# Patient Record
Sex: MALE | Race: WHITE | URBAN - METROPOLITAN AREA
[De-identification: names, ages, dates, MRNs, and addresses within clinical notes are randomized per-mention and may not be internally consistent; named-entity substitution may affect disease eponyms.]

---

## 2024-08-21 ENCOUNTER — OFFICE VISIT (OUTPATIENT)
Age: 29
End: 2024-08-21

## 2024-08-21 VITALS
WEIGHT: 213.8 LBS | SYSTOLIC BLOOD PRESSURE: 121 MMHG | HEIGHT: 70 IN | BODY MASS INDEX: 30.61 KG/M2 | DIASTOLIC BLOOD PRESSURE: 76 MMHG | HEART RATE: 74 BPM | TEMPERATURE: 98.5 F | OXYGEN SATURATION: 95 %

## 2024-08-21 DIAGNOSIS — J06.9 VIRAL URI WITH COUGH: Primary | ICD-10-CM

## 2024-08-21 DIAGNOSIS — J02.9 SORE THROAT: ICD-10-CM

## 2024-08-21 LAB
INFLUENZA A ANTIGEN, POC: NEGATIVE
INFLUENZA B ANTIGEN, POC: NEGATIVE
Lab: NORMAL
PERFORMING INSTRUMENT: NORMAL
QC PASS/FAIL: NORMAL
SARS-COV-2, POC: NORMAL

## 2024-08-21 RX ORDER — DEXTROMETHORPHAN HYDROBROMIDE AND PROMETHAZINE HYDROCHLORIDE 15; 6.25 MG/5ML; MG/5ML
5 SYRUP ORAL 4 TIMES DAILY PRN
Qty: 118 ML | Refills: 0 | Status: SHIPPED | OUTPATIENT
Start: 2024-08-21 | End: 2024-08-28

## 2024-08-21 ASSESSMENT — ENCOUNTER SYMPTOMS
SORE THROAT: 1
RHINORRHEA: 1
DIARRHEA: 0
COUGH: 1
VOMITING: 0
SHORTNESS OF BREATH: 0
ABDOMINAL PAIN: 0

## 2024-08-21 NOTE — PATIENT INSTRUCTIONS
New Prescriptions    PROMETHAZINE-DEXTROMETHORPHAN (PROMETHAZINE-DM) 6.25-15 MG/5ML SYRUP    Take 5 mLs by mouth 4 times daily as needed for Cough     Take tylenol and/or ibuprofen for pain/fever relief.  Encourage rest and increase fluid intake.  Take the cough medicine as needed for symptom relief.  Follow-up with your PCP as needed.  Return for severe/worsening symptoms.

## 2024-08-21 NOTE — PROGRESS NOTES
Ortiz Reinoso (:  1995) is a 28 y.o. male,New patient, here for evaluation of the following chief complaint(s):  Covid Testing (PT. C/O PRODUCTIVE COUGH, WITH GENERALIZED BODY ACHES, HOT COLD CHILLS X 3 DAYS. STATES WANTS COVID TESTING.)      Assessment & Plan :  Visit Diagnoses and Associated Orders       Viral URI with cough    -  Primary    promethazine-dextromethorphan (PROMETHAZINE-DM) 6.25-15 MG/5ML syrup [97060]           Sore throat        POCT COVID-19, Antigen [YDO125 Custom]      POCT Influenza A/B Antigen (BD Veritor) [83874 Custom]                 Results for POC orders placed in visit on 24   POCT Influenza A/B Antigen (BD Veritor)   Result Value Ref Range    Inflenza A Ag NEGATIVE     Influenza B Ag NEGATIVE        Differential diagnoses: bronchitis, pneumonia, sinusitis, strep pharyngitis, viral pharyngitis, viral URI, allergic rhinitis, covid, influenza, otitis media, tonsillar abscess     Plan: Covid and influenza are negative today in the office. Advised to continue to take tylenol and/or ibuprofen for pain/fever relief. Discussed that this is most likely viral. Will be prescribed cough medicine for symptom relief. Encouraged rest and increase fluid intake. Return for severe/worsening symptoms. Follow up in 3 days if symptoms persist or if symptoms worsen.       Subjective :  HPI  Ortiz Reinoso is a 28 y.o. male who presents with complaints of a cough, runny nose, nasal congestion, fever, and chills. He states that he started with symptoms  night. He reports that he has had a productive cough and has been coughing up green sputum. He has also noted body aches. He does feel mildly short of breath at times. He has been taking tylenol cold and flu with significant improvement in his symptoms. He denies any known sick contacts but was visiting on Friday at the hospital so may have been exposed at that time. He reports that his symptoms have improved today. He has also had

## 2024-08-26 ENCOUNTER — OFFICE VISIT (OUTPATIENT)
Age: 29
End: 2024-08-26

## 2024-08-26 VITALS
WEIGHT: 213 LBS | TEMPERATURE: 98.2 F | HEART RATE: 88 BPM | DIASTOLIC BLOOD PRESSURE: 72 MMHG | BODY MASS INDEX: 30.49 KG/M2 | SYSTOLIC BLOOD PRESSURE: 114 MMHG | HEIGHT: 70 IN

## 2024-08-26 DIAGNOSIS — J02.9 SORE THROAT: Primary | ICD-10-CM

## 2024-08-26 DIAGNOSIS — R05.1 ACUTE COUGH: ICD-10-CM

## 2024-08-26 DIAGNOSIS — R19.7 DIARRHEA OF PRESUMED INFECTIOUS ORIGIN: ICD-10-CM

## 2024-08-26 LAB — STREPTOCOCCUS A RNA: NEGATIVE

## 2024-08-26 NOTE — PROGRESS NOTES
[facility]    Ortiz Reinoso (:  1995) is a 28 y.o. male, here for evaluation of the following chief complaint(s):  Follow-up (Pt here last week Wednesday for covid like symptoms, is not getting better yet, c/o diarrhea,hot and cold, shortness of breath.)    ASSESSMENT/PLAN:    ICD-10-CM    1. Sore throat  J02.9 POCT Rapid Strep A DNA (Alere i)      2. Acute cough  R05.1 XR CHEST STANDARD (2 VW)      3. Diarrhea of presumed infectious origin  R19.7 GI Bacterial Pathogens By PCR     C DIFF TOXIN/ANTIGEN        New Prescriptions    No medications on file     Summary  - I am concerned about his diarrhea and will be doing stool testing to rule C. Diff.   - He has wheezing in his lungs so I will be doing a CXR to check for this as well.   - So far, flu, strep, and covid are all negative.   - I spent approximately 25 minutes, possibly even more, evaluating this patient's condition which consisted of reviewing their chart, contemplating medical decision making, diagnosing, and treating.  - This patient case has variables that make this case complex which include two possibly separate illnesses in two different body systems.   - The prognosis for this condition is not entirely certain as this complaint was complicated by the potential for a nosocomial infection.   - A work excuse was provided to give the patient time off to rest.  - I explained the warning signs of when to go to the emergency room.   - Follow up discussed and will be on an as-needed basis.  Differentials include: C. Diff, other infectious diarrhea, pneumonia.     SUBJECTIVE/OBJECTIVE:  HPI  Onset for this issue was 10 day(s) ago. He was seen for this issue 5 days ago when he tested negative for flu and covid and was given cough syrup. He says that he is still coughing, shortness of breath, and has had diarrhea since 7 days ago.  Further, he reports a loss of appetite for the same timeframe, he describes the diarrhea as an extremely

## 2024-08-27 ENCOUNTER — HOSPITAL ENCOUNTER (OUTPATIENT)
Dept: GENERAL RADIOLOGY | Age: 29
Discharge: HOME | End: 2024-08-27
Payer: COMMERCIAL

## 2024-08-27 ENCOUNTER — HOSPITAL ENCOUNTER (OUTPATIENT)
Age: 29
Discharge: HOME | End: 2024-08-27
Payer: COMMERCIAL

## 2024-08-27 DIAGNOSIS — R05.1 ACUTE COUGH: ICD-10-CM

## 2024-08-27 PROCEDURE — 71046 X-RAY EXAM CHEST 2 VIEWS: CPT

## 2024-08-28 ENCOUNTER — OFFICE VISIT (OUTPATIENT)
Age: 29
End: 2024-08-28

## 2024-08-28 VITALS
TEMPERATURE: 98.3 F | WEIGHT: 213 LBS | OXYGEN SATURATION: 93 % | HEART RATE: 93 BPM | HEIGHT: 70 IN | DIASTOLIC BLOOD PRESSURE: 73 MMHG | SYSTOLIC BLOOD PRESSURE: 116 MMHG | BODY MASS INDEX: 30.49 KG/M2

## 2024-08-28 DIAGNOSIS — J22 LOWER RESPIRATORY TRACT INFECTION: ICD-10-CM

## 2024-08-28 DIAGNOSIS — J40 BRONCHITIS: Primary | ICD-10-CM

## 2024-08-28 RX ORDER — DOXYCYCLINE HYCLATE 100 MG
100 TABLET ORAL 2 TIMES DAILY
Qty: 14 TABLET | Refills: 0 | Status: SHIPPED | OUTPATIENT
Start: 2024-08-28 | End: 2024-09-04

## 2024-08-28 RX ORDER — ALBUTEROL SULFATE 90 UG/1
2 AEROSOL, METERED RESPIRATORY (INHALATION) 4 TIMES DAILY PRN
Qty: 18 G | Refills: 0 | Status: SHIPPED | OUTPATIENT
Start: 2024-08-28

## 2024-08-28 RX ORDER — DEXAMETHASONE SODIUM PHOSPHATE 10 MG/ML
10 INJECTION INTRAMUSCULAR; INTRAVENOUS ONCE
Status: COMPLETED | OUTPATIENT
Start: 2024-08-28 | End: 2024-08-28

## 2024-08-28 RX ORDER — ALBUTEROL SULFATE 1.25 MG/3ML
1.25 SOLUTION RESPIRATORY (INHALATION) ONCE
Status: COMPLETED | OUTPATIENT
Start: 2024-08-28 | End: 2024-08-28

## 2024-08-28 RX ORDER — METHYLPREDNISOLONE 4 MG
TABLET, DOSE PACK ORAL
Qty: 1 KIT | Refills: 0 | Status: SHIPPED | OUTPATIENT
Start: 2024-08-28 | End: 2024-09-03

## 2024-08-28 RX ADMIN — DEXAMETHASONE SODIUM PHOSPHATE 10 MG: 10 INJECTION INTRAMUSCULAR; INTRAVENOUS at 11:05

## 2024-08-28 RX ADMIN — ALBUTEROL SULFATE 1.25 MG: 1.25 SOLUTION RESPIRATORY (INHALATION) at 11:07

## 2024-08-28 ASSESSMENT — ENCOUNTER SYMPTOMS
DIARRHEA: 0
VOMITING: 0
CHEST TIGHTNESS: 1
COUGH: 1
SORE THROAT: 0
WHEEZING: 1
ABDOMINAL PAIN: 0
SHORTNESS OF BREATH: 1
RHINORRHEA: 0

## 2024-08-28 NOTE — PROGRESS NOTES
Ortiz Reinoso (:  1995) is a 28 y.o. male,Established patient, here for evaluation of the following chief complaint(s):  Follow-up (Pt here for follow up his URI symptoms, c/o short of breath and cough.)      Assessment & Plan :  Visit Diagnoses and Associated Orders       Bronchitis    -  Primary    dexAMETHasone (DECADRON) injection 10 mg [2331]      albuterol (ACCUNEB) nebulizer solution 1.25 mg [22670]           Lower respiratory tract infection        albuterol sulfate HFA (VENTOLIN HFA) 108 (90 Base) MCG/ACT inhaler [02929]      methylPREDNISolone (MEDROL DOSEPACK) 4 MG tablet [4991]      doxycycline hyclate (VIBRA-TABS) 100 MG tablet [2625]                 Differential diagnoses: bronchitis, pneumonia, sinusitis, strep pharyngitis, viral pharyngitis, viral URI, allergic rhinitis, covid, influenza, otitis media, tonsillar abscess     Plan: Appears to have a lower respiratory tract infection as he has symptoms for almost two weeks he will be treated today with doxycycline for the infection. He has previously had a negative covid, flu, and strep testing this past week. He was given an albuterol treatment in the office today and also given decadron IM for symptom relief. He will be prescribed a steroid pack and albuterol inhaler. Encouraged to rest and increase fluid intake and continue to take tylenol and/or ibuprofen for pain/fever relief. Return/ED precautions discussed. Follow up in 3 days if symptoms persist or if symptoms worsen.       Subjective :  HPI  Ortiz Reinoso is a 28 y.o. male who presents with complaints of ongoing cough and wheezing. He reports that he started with symptoms 10 days ago. He was initially diagnosed with a viral URI. He states that he was seen here again Monday and a chest x-ray was performed which showed no acute process. He continues to have wheezing and feels short of breath with exertion. He denies any history of asthma but does remember having an inhaler as a

## 2024-08-28 NOTE — PATIENT INSTRUCTIONS
New Prescriptions    ALBUTEROL SULFATE HFA (VENTOLIN HFA) 108 (90 BASE) MCG/ACT INHALER    Inhale 2 puffs into the lungs 4 times daily as needed for Wheezing    DOXYCYCLINE HYCLATE (VIBRA-TABS) 100 MG TABLET    Take 1 tablet by mouth 2 times daily for 7 days    METHYLPREDNISOLONE (MEDROL DOSEPACK) 4 MG TABLET    Take by mouth per package directions.      Take the antibiotic as prescribed.  Take the steroid as directed.  Use the inhaler as needed for symptom relief.  Encourage rest and increase fluid intake.  Take tylenol and/or ibuprofen for pain/fever relief.  Follow-up with your PCP as needed.  Return for severe/worsening symptoms.

## 2024-09-19 DIAGNOSIS — J22 LOWER RESPIRATORY TRACT INFECTION: ICD-10-CM

## 2024-09-25 RX ORDER — ALBUTEROL SULFATE 90 UG/1
2 INHALANT RESPIRATORY (INHALATION) 4 TIMES DAILY PRN
Qty: 8.5 EACH | OUTPATIENT
Start: 2024-09-25